# Patient Record
Sex: FEMALE | Race: WHITE | NOT HISPANIC OR LATINO | Employment: FULL TIME | URBAN - METROPOLITAN AREA
[De-identification: names, ages, dates, MRNs, and addresses within clinical notes are randomized per-mention and may not be internally consistent; named-entity substitution may affect disease eponyms.]

---

## 2017-11-28 ENCOUNTER — OFFICE VISIT (OUTPATIENT)
Dept: PRIMARY CARE CLINIC | Facility: CLINIC | Age: 47
End: 2017-11-28
Payer: COMMERCIAL

## 2017-11-28 VITALS
TEMPERATURE: 99 F | DIASTOLIC BLOOD PRESSURE: 80 MMHG | SYSTOLIC BLOOD PRESSURE: 138 MMHG | HEIGHT: 61 IN | WEIGHT: 203.69 LBS | HEART RATE: 84 BPM | BODY MASS INDEX: 38.46 KG/M2

## 2017-11-28 DIAGNOSIS — L20.9 ATOPIC DERMATITIS, UNSPECIFIED TYPE: Primary | ICD-10-CM

## 2017-11-28 DIAGNOSIS — E66.9 OBESITY (BMI 30-39.9): ICD-10-CM

## 2017-11-28 DIAGNOSIS — I10 ESSENTIAL HYPERTENSION: ICD-10-CM

## 2017-11-28 DIAGNOSIS — L29.9 ITCHING: ICD-10-CM

## 2017-11-28 PROBLEM — M24.542 CONTRACTURE OF JOINT OF FINGER, LEFT: Status: ACTIVE | Noted: 2017-11-28

## 2017-11-28 PROBLEM — M79.7 FIBROMYALGIA: Status: ACTIVE | Noted: 2017-11-28

## 2017-11-28 PROBLEM — Z63.6 CAREGIVER STRESS: Status: ACTIVE | Noted: 2017-11-28

## 2017-11-28 PROBLEM — B00.9 HSV-1 INFECTION: Status: ACTIVE | Noted: 2017-11-28

## 2017-11-28 PROBLEM — F41.8 ANXIETY WITH DEPRESSION: Status: ACTIVE | Noted: 2017-11-28

## 2017-11-28 PROBLEM — H04.123 CHRONIC DRYNESS OF BOTH EYES: Status: ACTIVE | Noted: 2017-11-28

## 2017-11-28 PROBLEM — H15.103 EPISCLERITIS OF BOTH EYES: Status: ACTIVE | Noted: 2017-11-28

## 2017-11-28 PROCEDURE — 99203 OFFICE O/P NEW LOW 30 MIN: CPT | Mod: S$GLB,,, | Performed by: NURSE PRACTITIONER

## 2017-11-28 PROCEDURE — 99999 PR PBB SHADOW E&M-NEW PATIENT-LVL IV: CPT | Mod: PBBFAC,,, | Performed by: NURSE PRACTITIONER

## 2017-11-28 RX ORDER — ALPRAZOLAM 0.25 MG/1
0.25 TABLET ORAL 2 TIMES DAILY PRN
COMMUNITY
End: 2018-02-28 | Stop reason: SDUPTHER

## 2017-11-28 RX ORDER — HYDROCHLOROTHIAZIDE 12.5 MG/1
12.5 TABLET ORAL DAILY
Qty: 30 TABLET | Refills: 5 | Status: SHIPPED | OUTPATIENT
Start: 2017-11-28 | End: 2018-06-14

## 2017-11-28 RX ORDER — FAMOTIDINE 20 MG/1
20 TABLET, FILM COATED ORAL DAILY PRN
COMMUNITY
End: 2018-02-28 | Stop reason: SDUPTHER

## 2017-11-28 RX ORDER — HYDROXYZINE HYDROCHLORIDE 25 MG/1
25 TABLET, FILM COATED ORAL 3 TIMES DAILY
COMMUNITY

## 2017-11-28 RX ORDER — DULOXETIN HYDROCHLORIDE 60 MG/1
CAPSULE, DELAYED RELEASE ORAL
COMMUNITY
Start: 2017-11-20

## 2017-11-28 RX ORDER — CYCLOBENZAPRINE HCL 5 MG
TABLET ORAL
COMMUNITY
Start: 2017-11-02 | End: 2018-02-28

## 2017-11-28 RX ORDER — TRIAMCINOLONE ACETONIDE 1 MG/G
CREAM TOPICAL 2 TIMES DAILY
Qty: 45 G | Refills: 1 | Status: SHIPPED | OUTPATIENT
Start: 2017-11-28 | End: 2017-12-08

## 2017-11-28 RX ORDER — ACYCLOVIR 400 MG/1
TABLET ORAL
COMMUNITY
Start: 2017-11-20

## 2017-11-28 NOTE — PATIENT INSTRUCTIONS
Managing Atopic Dermatitis (Eczema)     After bathing, gently pat your skin dry (dont rub). Apply moisturizer while your skin is still damp.   To manage your symptoms and help reduce the severity and frequency, try these self-care tips:  Caring for your skin  · Use a gentle, fragrance-free cleanser (or nonsoap cleanser) for bathing. Rinse well. Pat skin dry.  · Take warm, not hot, baths or showers. Try to limit them to no more that 10 to 15 minutes.   · Use moisturizer liberally right after you bathe, while your skin is still damp.  · Avoid scratching because it will cause more damage to your skin.   · Topical, over-the-counter hydrocortisone cream may help control mild symptoms.   Controlling your environment  · Avoid extreme heat or cold.  · Avoid very humid or very dry air.  · If your home or office air is very dry, use a humidifier.  · Avoid allergens, such as dust, that may be present in bedding, carpets, plush toys, or rugs.  · Know that pet hair and dander can cause flare-ups.  Seeking medical treatment  Another way to keep symptoms under control is to seek medical treatment. Talk with your healthcare provider about the type of treatment that may work best for you. Your provider may prescribe treatments such as the following:  · Topical treatments to put on the skin daily  · Medicines taken by mouth (oral medicines), such as antihistamines, antibiotics, or corticosteroids  · In severe cases shots (injections) may be needed to control the symptoms. You may even need antibiotics if skin infections occur.  Treatments dont work the same way for every person. So if your symptoms continue or get worse, ask your healthcare provider about other treatments.  Making lifestyle choices  · Manage the stress in your life.  · Wear loose-fitting cotton clothing that does not bind or rub your skin.  · Avoid contact with wool or other scratchy fabrics.  · Use fragrance-free products.  Getting good results  Now that you  know more about atopic dermatitis, the next step is up to you. Follow your healthcare providers treatment plan and your self-care routine. This will help bring atopic dermatitis under control. If your symptoms persist, be sure to let your health care provider know.   Date Last Reviewed: 2/1/2017  © 6941-3376 [employer]. [address] [place] [address], [place] [address]. All rights reserved. This information is not intended as a substitute for professional medical care. Always follow your healthcare professional's instructions.

## 2017-11-28 NOTE — PROGRESS NOTES
Subjective:       Patient ID: Esther Ochoa is a 47 y.o. female.    Chief Complaint: Establish Care    Patient presents today requesting to re-establish with this provider as a primary care patient and also has acute complaints of a 1 week history of itching and rash to her left forearm, buttocks and shoulder.  She has been experiencing itching and rash and reports no known causes.  She has not used any new soaps, detergents or cosmetics.  She has no new environmental exposures.  She has trialed OTC medication for relief of symptoms without benefit.  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 She also has a ganglion cyst of the left hand that will be removed.   She has a known history of depression with anxiety and fibromyalgia that is well controlled on her current medication regimen. She has a known history of HTN and was previously taking HCTZ and Lisinopril but this was discontinued due to dizziness and hypotension after undergoing a loss in weight.  Today she reports her blood pressure has been elevated with her last reading in the 150's systolically and 90's diastolically.  She denies any other acute complaints     Of note: Patient is well known by this provider and was followed in [place] for 6 years.  Her annual exam is current.  She is due a mammogram but declines orders for this today and will obtain those at her follow up appoinment.  She is currently taking care of her ill father in-law that has end stage colon/perineal cancer, her mother and father with dementia/alzheimers.      Records from [facility] have been requested today.       Review of Systems   Constitutional: Positive for fatigue. Negative for activity change.   Eyes: Positive for redness.        Has history of episcleritis and chronic dry eye.    Respiratory: Negative for cough.     Musculoskeletal: Positive for arthralgias and myalgias (chronic myalgias and arthralgias.  Experiences symptoms intermitently ).   Skin: Positive for rash (red ithcing rash on left arm and shoulder and buttocks ).   Allergic/Immunologic: Positive for environmental allergies.   Psychiatric/Behavioral: Negative for self-injury, sleep disturbance and suicidal ideas. The patient is nervous/anxious.    All other systems reviewed and are negative.      Objective:      Physical Exam   Constitutional: She is oriented to person, place, and time. She appears well-developed and well-nourished. No distress.   obese   HENT:   Head: Normocephalic and atraumatic.   Neck: Normal range of motion. Neck supple.   Cardiovascular: Normal rate, regular rhythm and normal heart sounds.    Pulmonary/Chest: Effort normal and breath sounds normal.   Abdominal: Soft. Bowel sounds are normal.   Musculoskeletal: Normal range of motion. She exhibits no edema.   Neurological: She is alert and oriented to person, place, and time.   Skin: Skin is warm and dry. Capillary refill takes less than 2 seconds. Rash (there is a diffusely scattered mildly erythematous scaly rash on left FA, shoulder and buttock with few plaque like lesions.  no drainage, warmth or induration.  Few excorations. ) noted. She is not diaphoretic.   Psychiatric: She has a normal mood and affect. Her behavior is normal. Judgment and thought content normal.   Nursing note and vitals reviewed.      Assessment:       1. Atopic dermatitis, unspecified type    2. Itching    3. Essential hypertension    4. Obesity (BMI 30-39.9)        Plan:       Atopic dermatitis, unspecified type  -     triamcinolone acetonide 0.1% (KENALOG) 0.1 % cream; Apply topically 2 (two) times daily.  Dispense: 45 g; Refill: 1    Itching  -     triamcinolone acetonide 0.1% (KENALOG) 0.1 % cream; Apply topically 2 (two) times daily.  Dispense: 45 g; Refill: 1  Patient has hydroxyzine at home will use for  itching.  She will not take it if she is taking her xanax.  She does report she is rarely taking xanax.   Essential hypertension  -     hydroCHLOROthiazide (HYDRODIURIL) 12.5 MG Tab; Take 1 tablet (12.5 mg total) by mouth once daily.  Dispense: 30 tablet; Refill: 5  We will restart hctz at low dose and titrate up as appropriate.  Plan as noted below.    Obesity (BMI 30-39.9)  Counseled pt. BMI goal has been set.  Plan as noted.   Other orders  -     Cancel: Influenza - Quadrivalent (3 years & older) (PF)      Patient Readiness:  acceptance  Barriers: none    Today, the patient was counseled on the following:     Hypertension:    Instructed on the need to adhere to low sodium diet   Instructed to continue to monitor BP and keep log and bring to next appt.   Encouraged to exercise most days of the week greater than 30 minutes daily   Instructed to avoid medications with decongestants and to avoid routine use of NSAIDS  Encouraged to monitor weight and to maintain BMI between 19 and 15.      Obesity:     Discussed diet strategies to decrease caloric intake to further aid in weight loss  Encouraged to exercise most days of the week greater than 30 minutes daily   Encouraged to monitor weight daily and to keep food diary.  Set BMI goal between 19 and 25 and weight loss of 1/4 pound to 2 pounds per week.     Goals:    Hypertension: Decrease blood pressure to <130/80  Obesity: Decrease BMI and increase physical activity      Is patient meeting goals/outcomes: no       Medication List with Changes/Refills   New Medications    HYDROCHLOROTHIAZIDE (HYDRODIURIL) 12.5 MG TAB    Take 1 tablet (12.5 mg total) by mouth once daily.    TRIAMCINOLONE ACETONIDE 0.1% (KENALOG) 0.1 % CREAM    Apply topically 2 (two) times daily.   Current Medications    ACYCLOVIR (ZOVIRAX) 400 MG TABLET        ALPRAZOLAM (XANAX) 0.25 MG TABLET    Take 0.25 mg by mouth 2 (two) times daily as needed for Insomnia or Anxiety.    CYCLOBENZAPRINE (FLEXERIL) 5  MG TABLET        DULOXETINE (CYMBALTA) 60 MG CAPSULE        FAMOTIDINE (PEPCID) 20 MG TABLET    Take 20 mg by mouth daily as needed for Heartburn.    HYDROXYZINE HCL (ATARAX) 25 MG TABLET    Take 25 mg by mouth 3 (three) times daily.         Patient will f/u in 3 months for her annual exam or sooner if needed.      I have reviewed the patient's past medical/surgical and social histories and updated as appropriate. Medications were reviewed and discussed as appropriate including side effects and risks versus benefit. Sedation precautions reinforced.     Plan of care was reviewed and agreed upon with the patient.  An opportunity to ask questions was provided and explanation given. Patient verbalized understanding on all information reviewed and discussed.  The patient will follow up at her routinely scheduled appointment. If symptoms worsen patient may call for ASAP appointment or report to the emergency department for further evaluation.

## 2018-01-30 DIAGNOSIS — I10 ESSENTIAL HYPERTENSION: ICD-10-CM

## 2018-01-30 DIAGNOSIS — R53.83 FATIGUE, UNSPECIFIED TYPE: ICD-10-CM

## 2018-01-30 DIAGNOSIS — Z00.00 ANNUAL PHYSICAL EXAM: Primary | ICD-10-CM

## 2018-01-30 DIAGNOSIS — E66.9 OBESITY (BMI 30-39.9): ICD-10-CM

## 2018-01-30 DIAGNOSIS — E89.41 SURGICAL MENOPAUSE, SYMPTOMATIC: ICD-10-CM

## 2018-01-30 DIAGNOSIS — F41.8 ANXIETY WITH DEPRESSION: ICD-10-CM

## 2018-01-30 DIAGNOSIS — R92.8 ABNORMALITY OF LEFT BREAST ON SCREENING MAMMOGRAM: ICD-10-CM

## 2018-02-28 ENCOUNTER — OFFICE VISIT (OUTPATIENT)
Dept: PRIMARY CARE CLINIC | Facility: CLINIC | Age: 48
End: 2018-02-28
Payer: COMMERCIAL

## 2018-02-28 VITALS
HEIGHT: 61 IN | HEART RATE: 83 BPM | TEMPERATURE: 99 F | DIASTOLIC BLOOD PRESSURE: 75 MMHG | WEIGHT: 201.63 LBS | BODY MASS INDEX: 38.07 KG/M2 | OXYGEN SATURATION: 99 % | SYSTOLIC BLOOD PRESSURE: 133 MMHG | RESPIRATION RATE: 16 BRPM

## 2018-02-28 DIAGNOSIS — M79.7 FIBROMYALGIA: ICD-10-CM

## 2018-02-28 DIAGNOSIS — Z00.00 ANNUAL PHYSICAL EXAM: Primary | ICD-10-CM

## 2018-02-28 DIAGNOSIS — Z63.6 CAREGIVER STRESS: ICD-10-CM

## 2018-02-28 DIAGNOSIS — K21.9 GASTROESOPHAGEAL REFLUX DISEASE WITHOUT ESOPHAGITIS: ICD-10-CM

## 2018-02-28 DIAGNOSIS — E66.9 OBESITY (BMI 30-39.9): ICD-10-CM

## 2018-02-28 DIAGNOSIS — F41.8 ANXIETY WITH DEPRESSION: ICD-10-CM

## 2018-02-28 DIAGNOSIS — J30.2 ACUTE SEASONAL ALLERGIC RHINITIS, UNSPECIFIED TRIGGER: ICD-10-CM

## 2018-02-28 DIAGNOSIS — I10 ESSENTIAL HYPERTENSION: ICD-10-CM

## 2018-02-28 PROCEDURE — 99999 PR PBB SHADOW E&M-EST. PATIENT-LVL IV: CPT | Mod: PBBFAC,,, | Performed by: NURSE PRACTITIONER

## 2018-02-28 PROCEDURE — 99396 PREV VISIT EST AGE 40-64: CPT | Mod: S$GLB,,, | Performed by: NURSE PRACTITIONER

## 2018-02-28 RX ORDER — LEVOCETIRIZINE DIHYDROCHLORIDE 5 MG/1
5 TABLET, FILM COATED ORAL DAILY PRN
Qty: 30 TABLET | Refills: 6 | Status: SHIPPED | OUTPATIENT
Start: 2018-02-28 | End: 2019-02-28

## 2018-02-28 RX ORDER — FAMOTIDINE 20 MG/1
20 TABLET, FILM COATED ORAL 2 TIMES DAILY PRN
Qty: 60 TABLET | Refills: 1 | Status: SHIPPED | OUTPATIENT
Start: 2018-02-28

## 2018-02-28 RX ORDER — CYCLOBENZAPRINE HCL 5 MG
5 TABLET ORAL 3 TIMES DAILY PRN
Qty: 30 TABLET | Refills: 1 | Status: SHIPPED | OUTPATIENT
Start: 2018-02-28 | End: 2018-03-10

## 2018-02-28 RX ORDER — NAPROXEN 500 MG/1
500 TABLET ORAL
COMMUNITY
Start: 2017-12-19 | End: 2018-02-28 | Stop reason: SDUPTHER

## 2018-02-28 RX ORDER — NAPROXEN 500 MG/1
500 TABLET ORAL 2 TIMES DAILY PRN
Qty: 30 TABLET | Refills: 1 | Status: SHIPPED | OUTPATIENT
Start: 2018-02-28 | End: 2018-06-14

## 2018-02-28 RX ORDER — ALPRAZOLAM 0.25 MG/1
0.25 TABLET ORAL 2 TIMES DAILY PRN
Qty: 60 TABLET | Refills: 1 | Status: SHIPPED | OUTPATIENT
Start: 2018-02-28

## 2018-02-28 SDOH — SOCIAL DETERMINANTS OF HEALTH (SDOH): DEPENDENT RELATIVE NEEDING CARE AT HOME: Z63.6

## 2018-02-28 NOTE — PROGRESS NOTES
Subjective:       Patient ID: Esther Ochoa is a 47 y.o. female.    Chief Complaint: Annual Exam (Follow up on lab work)    Patient presents today for 3 months follow up HTN and to undergo her annual physical exam.  She did undergo labs and completed her mammogram and we will review those results today. At last visit patient was restarted on HCTZ to optimize her blood pressure to <140/90.  She underwent counseling on the need to implement TLCs including diet and exercise modifications.  Today, she reports she is exercising at the gym 4 to 5 times per week and has started to adhere to a reduced carb and sugar diet.  She has lost 2.5 pounds since her last visit.  She reports today her seasonal allergies are causing her to experience nasal congestion and PND.  She denies any other acute symptoms or complaints  She continues to care for her elderly parents with Alzheimers and she reports her stress is without change.  She is in need of refills of her maintenance medications.  Her GERD is fairly well controlled if she avoids food triggers.      She does undergo routine eye and dental exams.       Review of Systems   Constitutional: Negative for activity change, appetite change, fatigue, fever and unexpected weight change.   HENT: Positive for postnasal drip, rhinorrhea and sore throat (burining ). Negative for congestion, sinus pain and sinus pressure.    Eyes: Negative for pain, discharge, redness and itching.        Chronic intermittent dry eye   Respiratory: Negative for cough, shortness of breath and wheezing.    Cardiovascular: Negative for chest pain, palpitations and leg swelling.   Gastrointestinal: Negative for abdominal pain, diarrhea, nausea and vomiting.   Genitourinary: Negative for difficulty urinating, dysuria, frequency and urgency.   Musculoskeletal: Positive for myalgias (intermittent chronic myalgias ).   Neurological: Negative for dizziness and headaches.   Hematological: Negative for adenopathy.    All other systems reviewed and are negative.      Objective:      Physical Exam   Constitutional: She is oriented to person, place, and time. She appears well-developed and well-nourished. No distress.   obese   HENT:   Head: Normocephalic and atraumatic.   Right Ear: External ear normal.   Left Ear: External ear normal.   Mouth/Throat: No oropharyngeal exudate.   Bilateral nares inflamed and mucosa purplish red and boggy.  Scant amount clear rhinorrhea.  Throat: mildly erythematous with PND and cobblestoning.      Eyes: Conjunctivae and EOM are normal. Pupils are equal, round, and reactive to light. Right eye exhibits no discharge. Left eye exhibits no discharge. No scleral icterus.   Neck: Normal range of motion. Neck supple. No tracheal deviation present. No thyromegaly present.   Cardiovascular: Normal rate, regular rhythm, normal heart sounds and intact distal pulses.  Exam reveals no gallop and no friction rub.    No murmur heard.  Pulmonary/Chest: Effort normal and breath sounds normal. No respiratory distress. She has no wheezes.   Abdominal: Soft. Bowel sounds are normal. She exhibits no distension. There is no tenderness.   Musculoskeletal: Normal range of motion. She exhibits deformity. She exhibits no edema or tenderness.   Obvious left wrist surgical scar observed.  Left 4th and 5th digit with limited ROM flexion and extension s/p contracture release and hardware removal.  There is no warmth or redness. No tenderness with palpation.  Capillary refill and peripheral pulses are normal.      Lymphadenopathy:     She has no cervical adenopathy.   Neurological: She is alert and oriented to person, place, and time. She displays normal reflexes. No cranial nerve deficit. Coordination normal.   Skin: Skin is warm and dry. Capillary refill takes less than 2 seconds. No rash noted. She is not diaphoretic. No erythema. No pallor.   Psychiatric: She has a normal mood and affect. Her behavior is normal. Judgment  and thought content normal.   Nursing note and vitals reviewed.        Labs of 2/26/18 reviewed with patient today.  CBC and CMP wnl with the exception of calcium is minimally decreased at 8.8mg/dl and Alk phos of 108.  TSH is wnl. Lipids are as follows: , TG 88, HDL 62, LDL 99. Hemoglobin A1c ws 5.3 wnl.  Microalbumin wnl. Vitamin D is 27.6 and is insufficient. UA with not significant abnormal findings.     Mammogram completed at [facility] as well was without acute findings Bi-Rads Cat 2 per radiologist.   Assessment:       1. Annual physical exam    2. Essential hypertension    3. Acute seasonal allergic rhinitis, unspecified trigger    4. Anxiety with depression    5. Obesity (BMI 30-39.9)    6. Fibromyalgia    7. Caregiver stress    8. Gastroesophageal reflux disease without esophagitis        Plan:       Annual physical exam  Overall doing well.  Refills of maintenance medications provided.  Counseled on need to implement TLCs to decrease stress, weight/BMI and optimize Bp.    Essential hypertension    Acute seasonal allergic rhinitis, unspecified trigger  -     levocetirizine (XYZAL) 5 MG tablet; Take 1 tablet (5 mg total) by mouth daily as needed for Allergies.  Dispense: 30 tablet; Refill: 6  OTC Sensimist as needed per label   Anxiety with depression  -     ALPRAZolam (XANAX) 0.25 MG tablet; Take 1 tablet (0.25 mg total) by mouth 2 (two) times daily as needed for Insomnia or Anxiety.  Dispense: 60 tablet; Refill: 1    Obesity (BMI 30-39.9)  Counseled as below  Fibromyalgia  -     naproxen (NAPROSYN) 500 MG tablet; Take 1 tablet (500 mg total) by mouth 2 (two) times daily as needed.  Dispense: 30 tablet; Refill: 1  Cautioned on the use of NSAID and risks associated with including renal, GI and CV risks.  Pt. Is to use Naproxen sparingly as prescribed.  Take with food. Flexeril refill provided for PRN use. Sedation precautions reinforced and patient instructed she may not take xanax and flexeril  together.    Caregiver stress  -     ALPRAZolam (XANAX) 0.25 MG tablet; Take 1 tablet (0.25 mg total) by mouth 2 (two) times daily as needed for Insomnia or Anxiety.  Dispense: 60 tablet; Refill: 1  Patient has had a prescription of as needed xanax since 2016 and has not had a refill since that time. She continues to experience increased anxiety, care giver stress and intermittent sleep difficulty.  She will be given a refill and was counseled to this medication is not for everyday use.    Gastroesophageal reflux disease without esophagitis  -     famotidine (PEPCID) 20 MG tablet; Take 1 tablet (20 mg total) by mouth 2 (two) times daily as needed for Heartburn.  Dispense: 60 tablet; Refill: 1  Doing well.  Refill provided.   Other orders  -     cyclobenzaprine (FLEXERIL) 5 MG tablet; Take 1 tablet (5 mg total) by mouth 3 (three) times daily as needed for Muscle spasms.  Dispense: 30 tablet; Refill: 1      Medication List with Changes/Refills   New Medications    CYCLOBENZAPRINE (FLEXERIL) 5 MG TABLET    Take 1 tablet (5 mg total) by mouth 3 (three) times daily as needed for Muscle spasms.    LEVOCETIRIZINE (XYZAL) 5 MG TABLET    Take 1 tablet (5 mg total) by mouth daily as needed for Allergies.   Current Medications    ACYCLOVIR (ZOVIRAX) 400 MG TABLET        DULOXETINE (CYMBALTA) 60 MG CAPSULE        HYDROCHLOROTHIAZIDE (HYDRODIURIL) 12.5 MG TAB    Take 1 tablet (12.5 mg total) by mouth once daily.    HYDROXYZINE HCL (ATARAX) 25 MG TABLET    Take 25 mg by mouth 3 (three) times daily.    TRIAMCINOLONE ACETONIDE 0.1% (KENALOG) 0.1 % CREAM    Apply topically 2 (two) times daily.   Changed and/or Refilled Medications    Modified Medication Previous Medication    ALPRAZOLAM (XANAX) 0.25 MG TABLET ALPRAZolam (XANAX) 0.25 MG tablet       Take 1 tablet (0.25 mg total) by mouth 2 (two) times daily as needed for Insomnia or Anxiety.    Take 0.25 mg by mouth 2 (two) times daily as needed for Insomnia or Anxiety.    FAMOTIDINE  (PEPCID) 20 MG TABLET famotidine (PEPCID) 20 MG tablet       Take 1 tablet (20 mg total) by mouth 2 (two) times daily as needed for Heartburn.    Take 20 mg by mouth daily as needed for Heartburn.    NAPROXEN (NAPROSYN) 500 MG TABLET naproxen (NAPROSYN) 500 MG tablet       Take 1 tablet (500 mg total) by mouth 2 (two) times daily as needed.    Take 500 mg by mouth as needed.   Discontinued Medications    CYCLOBENZAPRINE (FLEXERIL) 5 MG TABLET             Patient Readiness:  acceptance  Barriers: none    Today, the patient was counseled on the following:     Hypertension:    Instructed on the need to adhere to low sodium diet   Encouraged to exercise most days of the week greater than 30 minutes daily   Instructed to avoid medications with decongestants and to avoid routine use of NSAIDS  Encouraged to monitor weight and to maintain BMI between 19 and 25.      Obesity:     Discussed diet strategies to decrease caloric intake to further aid in weight loss  Encouraged to exercise most days of the week greater than 30 minutes daily   Encouraged to monitor weight daily and to keep food diary.  Set BMI goal between 19 and 25 and weight loss of 1/4 pound to 2 pounds per week.     Goals:  Hypertension: Decrease blood pressure to <140/90 with optimal blood pressure of <130/80  Obesity: Decrease BMI and increase physical activity      Is patient meeting goals/outcomes: yes    I have reviewed the patient's past medical/surgical and social histories and updated as appropriate. Medications were reviewed and discussed as appropriate including side effects and risks versus benefit. Plan of care was reviewed and agreed upon with the patient.  An opportunity to ask questions was provided and explanation given. Patient verbalized understanding on all information reviewed and discussed.  The patient will follow up 6 months or sooner.

## 2018-02-28 NOTE — PATIENT INSTRUCTIONS
Progressive Relaxation  Your body needs relaxation to reduce stress and undo the fight-or-flight response. It helps to plan for 20 minutes of relaxation every day. This is time for yourself. Sit or lie comfortably. Limit distractions like phones. Listen to soft music or just sit in silence. And try the relaxation technique below.    How to do progressive relaxation  Progressive relaxation helps your whole body relax. To try this technique, follow these steps:  1. Find a quiet room. Sit in a comfortable chair or lie on your back.  2. Breathe in deeply to a slow count of 5. Feel your belly, chest, and back expand. Breathe out slowly to a count of 5.  3. After a few minutes, breathe in deeply. Tighten the muscles in your feet. Notice how it feels. Hold the tension for 3 seconds.  4. Breathe out while relaxing the tightened muscles. Notice how relaxed you feel.  5. Repeat steps 3 and 4 with another muscle group. You can move from your feet, calves, and thighs to your stomach, arms, and hands.  Remember the 4 As  · Avoid a stressor. If someone is smoking when youre trying to quit, leave the room.  · Accept a stressor you cant change, like a job loss, by knowing that your feelings are normal.  · Alter how you deal with a stressor. If a constantly ringing phone is a stressor, let the answering machine .  · Adapt to some stressors. When starting a new exercise program, instead of focusing on how hard it will be, think how good you will feel.  Date Last Reviewed: 2/25/2016  © 8121-5219 [employer]. [address] [place] [address], [place] [address]. All rights reserved. This information is not intended as a substitute for professional medical care. Always follow your healthcare professional's instructions.

## 2018-06-06 ENCOUNTER — TELEPHONE (OUTPATIENT)
Dept: PRIMARY CARE CLINIC | Facility: CLINIC | Age: 48
End: 2018-06-06

## 2018-06-06 NOTE — TELEPHONE ENCOUNTER
Patient's bp increased due to extra stress ...reading 149/100 ; patient inquiring whether she can take another hctz ? pls advise

## 2018-06-14 ENCOUNTER — OFFICE VISIT (OUTPATIENT)
Dept: FAMILY MEDICINE | Facility: CLINIC | Age: 48
End: 2018-06-14
Payer: COMMERCIAL

## 2018-06-14 VITALS
WEIGHT: 201 LBS | DIASTOLIC BLOOD PRESSURE: 98 MMHG | OXYGEN SATURATION: 98 % | BODY MASS INDEX: 37.95 KG/M2 | TEMPERATURE: 98 F | SYSTOLIC BLOOD PRESSURE: 163 MMHG | HEART RATE: 79 BPM | HEIGHT: 61 IN

## 2018-06-14 DIAGNOSIS — E66.9 OBESITY (BMI 30-39.9): ICD-10-CM

## 2018-06-14 DIAGNOSIS — F41.8 ANXIETY WITH DEPRESSION: ICD-10-CM

## 2018-06-14 DIAGNOSIS — Z63.6 CAREGIVER STRESS: ICD-10-CM

## 2018-06-14 DIAGNOSIS — I10 ESSENTIAL HYPERTENSION: Primary | ICD-10-CM

## 2018-06-14 PROCEDURE — 3080F DIAST BP >= 90 MM HG: CPT | Mod: S$GLB,ICN,, | Performed by: NURSE PRACTITIONER

## 2018-06-14 PROCEDURE — 3077F SYST BP >= 140 MM HG: CPT | Mod: S$GLB,ICN,, | Performed by: NURSE PRACTITIONER

## 2018-06-14 PROCEDURE — 99213 OFFICE O/P EST LOW 20 MIN: CPT | Mod: S$GLB,ICN,, | Performed by: NURSE PRACTITIONER

## 2018-06-14 PROCEDURE — 3008F BODY MASS INDEX DOCD: CPT | Mod: S$GLB,ICN,, | Performed by: NURSE PRACTITIONER

## 2018-06-14 RX ORDER — LISINOPRIL AND HYDROCHLOROTHIAZIDE 10; 12.5 MG/1; MG/1
1 TABLET ORAL DAILY
Qty: 90 TABLET | Refills: 1 | Status: SHIPPED | OUTPATIENT
Start: 2018-06-14 | End: 2018-09-27 | Stop reason: DRUGHIGH

## 2018-06-14 SDOH — SOCIAL DETERMINANTS OF HEALTH (SDOH): DEPENDENT RELATIVE NEEDING CARE AT HOME: Z63.6

## 2018-06-14 NOTE — PROGRESS NOTES
Subjective:       Patient ID: Esther Ochoa is a 48 y.o. female.    Chief Complaint: Follow-up    Patient presents to clinic today with complaints of increased blood pressure and anxiety.  She has a known history of HTN and in the past she was on Lisinopril HCTZ however she lost weight and was able to transition to taking HCTZ as monotherapy.  She reports she is continuing to act as a main caregiver to both her elderly parent who have progressive Alzheimers.  She reports she is now also experiencing marital difficulties involving substance abuse and it is affecting her child and herself.  She has been taking Xanax 0.25mg 1/2 to 1 tablet as needed for extreme anxiety and has been alternating it with hydroxyzine as needed.  She reports she is rarely using her Xanax and has almost a full bottle with the date of her last prescription noted on the label.  She is continuing her Cymbalta.  She is a dental assistant and reports in the last month her blood pressure has been consistently checked at work and it is mostly in the low 160s systolicaly and the 90's diastolic.  She denies any other acute complaints at this time.  She is denying any SI or HI.  She is eating well but continues to experience intermittent sleep difficulty.         Review of Systems   Constitutional: Positive for fatigue. Negative for activity change, appetite change, fever and unexpected weight change.   HENT: Negative.    Eyes: Negative.    Cardiovascular: Negative.    Gastrointestinal: Negative.    Endocrine: Negative.    Genitourinary: Negative.    Musculoskeletal: Positive for myalgias.   Skin: Negative.    Allergic/Immunologic: Positive for immunocompromised state.   Neurological: Negative.    Psychiatric/Behavioral: Positive for decreased concentration and sleep disturbance. The patient is nervous/anxious.    All other systems reviewed and are negative.      Objective:      Physical Exam   Constitutional: She is oriented to person, place, and  time. She appears well-developed and well-nourished. No distress.   HENT:   Head: Normocephalic and atraumatic.   Right Ear: External ear normal.   Left Ear: External ear normal.   Nose: Nose normal.   Eyes: Conjunctivae are normal. Pupils are equal, round, and reactive to light. Right eye exhibits no discharge. Left eye exhibits no discharge. No scleral icterus.   Neck: Normal range of motion. Neck supple.   Cardiovascular: Normal rate, regular rhythm, normal heart sounds and intact distal pulses.    Pulmonary/Chest: Effort normal and breath sounds normal. No respiratory distress.   Abdominal: Soft. Bowel sounds are normal. She exhibits no distension.   Musculoskeletal: Normal range of motion. She exhibits no edema.   Neurological: She is alert and oriented to person, place, and time. No cranial nerve deficit.   Skin: Skin is warm and dry. Capillary refill takes less than 2 seconds. No rash noted. She is not diaphoretic. No erythema.   Psychiatric: She has a normal mood and affect. Her behavior is normal. Judgment and thought content normal.   She is tearful at times   Nursing note and vitals reviewed.      Assessment:       1. Essential hypertension    2. Anxiety with depression    3. Caregiver stress    4. Obesity (BMI 30-39.9)        Plan:       Essential hypertension  -     lisinopril-hydrochlorothiazide (PRINZIDE,ZESTORETIC) 10-12.5 mg per tablet; Take 1 tablet by mouth once daily.  Dispense: 90 tablet; Refill: 1  We will restart her on Lisinopril HCTZ and she will return in 2 weeks for recheck.  She will continue to monitor her BP at work and keep a log.  She was encouraged to implement TLCs to decrease her BMI and reduce stress.  She is to adhere to low sodium diet and avoid energy drinks and caffeine.  Her BMI goal is 25.  Her BP goal is <130/80.    Anxiety with depression  -     lisinopril-hydrochlorothiazide (PRINZIDE,ZESTORETIC) 10-12.5 mg per tablet; Take 1 tablet by mouth once daily.  Dispense: 90  tablet; Refill: 1  Continue current medication regimen although, I have instructed to begin using her hydroxyzine 1/2 tablet routinely and observe for affect on her anxiety.  We may need to alternately change it to Buspar if not affective.  She is to implement stress reduction measures such as becoming more engaged in activities with friends, exercising and taking quiet time for her self.  We will refer her to behavior health as appropriate however, at this time she is reluctant to accept a referral based on financial and familial constraints.    Caregiver stress  Plan as noted above.   Obesity (BMI 30-39.9)  -     lisinopril-hydrochlorothiazide (PRINZIDE,ZESTORETIC) 10-12.5 mg per tablet; Take 1 tablet by mouth once daily.  Dispense: 90 tablet; Refill: 1  Implement TLCs including diet and exercise modifications.      Medication List with Changes/Refills   New Medications    LISINOPRIL-HYDROCHLOROTHIAZIDE (PRINZIDE,ZESTORETIC) 10-12.5 MG PER TABLET    Take 1 tablet by mouth once daily.   Current Medications    ACYCLOVIR (ZOVIRAX) 400 MG TABLET        ALPRAZOLAM (XANAX) 0.25 MG TABLET    Take 1 tablet (0.25 mg total) by mouth 2 (two) times daily as needed for Insomnia or Anxiety.    DULOXETINE (CYMBALTA) 60 MG CAPSULE        FAMOTIDINE (PEPCID) 20 MG TABLET    Take 1 tablet (20 mg total) by mouth 2 (two) times daily as needed for Heartburn.    HYDROXYZINE HCL (ATARAX) 25 MG TABLET    Take 25 mg by mouth 3 (three) times daily.    LEVOCETIRIZINE (XYZAL) 5 MG TABLET    Take 1 tablet (5 mg total) by mouth daily as needed for Allergies.    TRIAMCINOLONE ACETONIDE 0.1% (KENALOG) 0.1 % CREAM    Apply topically 2 (two) times daily.   Discontinued Medications    HYDROCHLOROTHIAZIDE (HYDRODIURIL) 12.5 MG TAB    Take 1 tablet (12.5 mg total) by mouth once daily.    NAPROXEN (NAPROSYN) 500 MG TABLET    Take 1 tablet (500 mg total) by mouth 2 (two) times daily as needed.           I have reviewed the patient's past  medical/surgical and social histories and updated as appropriate. Medications were reviewed and discussed as appropriate including side effects and risks versus benefit. Plan of care was reviewed and agreed upon with the patient.  An opportunity to ask questions was provided and explanation given. Patient verbalized understanding on all information reviewed and discussed.  The patient will follow up 2 weeksor sooner if needed. If symptoms worsen patient may call for ASAP appointment or report to the emergency department for further evaluation.

## 2018-06-14 NOTE — PATIENT INSTRUCTIONS
Hydrochlorothiazide, HCTZ; Lisinopril tablets  What is this medicine?  HYDROCHLOROTHIAZIDE; LISINOPRIL (camelia droe klor oh THYE a zide; lyse IN oh pril) is a combination of a diuretic and an ACE inhibitor. It is used to treat high blood pressure.  How should I use this medicine?  Take this medicine by mouth with a glass of water. Follow the directions on the prescription label. You can take it with or without food. If it upsets your stomach, take it with food. Take your medicine at regular intervals. Do not take it more often than directed. Do not stop taking except on your doctor's advice.  Talk to your pediatrician regarding the use of this medicine in children. Special care may be needed.  What side effects may I notice from receiving this medicine?  Side effects that you should report to your doctor or health care professional as soon as possible:  · changes in vision  · confusion, dizziness, light headedness or fainting spells  · decreased amount of urine passed  · difficulty breathing or swallowing, hoarseness, or tightening of the throat  · eye pain  · fast or irregular heart beat, palpitations, or chest pain  · muscle cramps  · nausea and vomiting  · persistent dry cough  · redness, blistering, peeling or loosening of the skin, including inside the mouth  · stomach pain  · swelling of your face, lips, tongue, hands, or feet  · unusual rash, bleeding or bruising, or pinpoint red spots on the skin  · worsened gout pain  · yellowing of the eyes or skin  Side effects that usually do not require medical attention (report to your doctor or health care professional if they continue or are bothersome):  · change in sex drive or performance  · cough  · headache  What may interact with this medicine?  · barbiturates like phenobarbital  · blood pressure medicines  · corticosteroids like prednisone  · diabetic medications  · diuretics, especially triamterene, spironolactone or amiloride  · lithium  · NSAIDs like  ibuprofen  · potassium salts or potassium supplements  · prescription pain medicines  · skeletal muscle relaxants like tubocurarine  · some cholesterol lowering medications like cholestyramine or colestipol  What if I miss a dose?  If you miss a dose, take it as soon as you can. If it is almost time for your next dose, take only that dose. Do not take double or extra doses.  Where should I keep my medicine?  Keep out of the reach of children.  Store at room temperature between 20 and 25 degrees C (68 and 77 degrees F). Protect from moisture and excessive light. Keep container tightly closed. Throw away any unused medicine after the expiration date.  What should I tell my health care provider before I take this medicine?  They need to know if you have any of these conditions:  · bone marrow disease  · decreased urine  · heart or blood vessel disease  · if you are on a special diet like a low salt diet  · immune system problems, like lupus  · kidney disease  · liver disease  · previous swelling of the tongue, face, or lips with difficulty breathing, difficulty swallowing, hoarseness, or tightening of the throat  · recent heart attack or stroke  · an unusual or allergic reaction to lisinopril, hydrochlorothiazide, sulfa drugs, other medicines, insect venom, foods, dyes, or preservatives  · pregnant or trying to get pregnant  · breast-feeding  What should I watch for while using this medicine?  Visit your doctor or health care professional for regular checks on your progress. Check your blood pressure as directed. Ask your doctor or health care professional what your blood pressure should be and when you should contact him or her. Call your doctor or health care professional if you notice an irregular or fast heart beat.  You must not get dehydrated. Ask your doctor or health care professional how much fluid you need to drink a day. Check with him or her if you get an attack of severe diarrhea, nausea and vomiting, or  if you sweat a lot. The loss of too much body fluid can make it dangerous for you to take this medicine.  Women should inform their doctor if they wish to become pregnant or think they might be pregnant. There is a potential for serious side effects to an unborn child. Talk to your health care professional or pharmacist for more information.  You may get drowsy or dizzy. Do not drive, use machinery, or do anything that needs mental alertness until you know how this drug affects you. Do not stand or sit up quickly, especially if you are an older patient. This reduces the risk of dizzy or fainting spells. Alcohol can make you more drowsy and dizzy. Avoid alcoholic drinks.  This medicine may affect your blood sugar level. If you have diabetes, check with your doctor or health care professional before changing the dose of your diabetic medicine.  Avoid salt substitutes unless you are told otherwise by your doctor or health care professional.  This medicine can make you more sensitive to the sun. Keep out of the sun. If you cannot avoid being in the sun, wear protective clothing and use sunscreen. Do not use sun lamps or tanning beds/booths.  Do not treat yourself for coughs, colds, or pain while you are taking this medicine without asking your doctor or health care professional for advice. Some ingredients may increase your blood pressure.  NOTE:This sheet is a summary. It may not cover all possible information. If you have questions about this medicine, talk to your doctor, pharmacist, or health care provider. Copyright© 2017 [employer]

## 2018-07-10 ENCOUNTER — TELEPHONE (OUTPATIENT)
Dept: PRIMARY CARE CLINIC | Facility: CLINIC | Age: 48
End: 2018-07-10

## 2018-09-27 DIAGNOSIS — I10 ESSENTIAL HYPERTENSION: Primary | ICD-10-CM

## 2018-09-27 RX ORDER — HYDROCHLOROTHIAZIDE 12.5 MG/1
12.5 TABLET ORAL DAILY
Qty: 90 TABLET | Refills: 3 | Status: SHIPPED | OUTPATIENT
Start: 2018-09-27 | End: 2019-09-27

## 2019-05-08 ENCOUNTER — TELEPHONE (OUTPATIENT)
Dept: ADMINISTRATIVE | Facility: HOSPITAL | Age: 49
End: 2019-05-08

## 2019-05-08 NOTE — TELEPHONE ENCOUNTER
Placed call to patient to schedule BP follow up. Spoke w/ patient and patient advised Dr. Neumann is no longer her PCP.